# Patient Record
Sex: MALE | Race: WHITE | Employment: FULL TIME | URBAN - METROPOLITAN AREA
[De-identification: names, ages, dates, MRNs, and addresses within clinical notes are randomized per-mention and may not be internally consistent; named-entity substitution may affect disease eponyms.]

---

## 2018-12-23 ENCOUNTER — APPOINTMENT (OUTPATIENT)
Dept: GENERAL RADIOLOGY | Facility: CLINIC | Age: 43
End: 2018-12-23
Attending: EMERGENCY MEDICINE

## 2018-12-23 ENCOUNTER — HOSPITAL ENCOUNTER (EMERGENCY)
Facility: CLINIC | Age: 43
Discharge: HOME | End: 2018-12-23
Attending: EMERGENCY MEDICINE | Admitting: EMERGENCY MEDICINE

## 2018-12-23 VITALS
OXYGEN SATURATION: 92 % | RESPIRATION RATE: 18 BRPM | SYSTOLIC BLOOD PRESSURE: 144 MMHG | DIASTOLIC BLOOD PRESSURE: 92 MMHG | WEIGHT: 215 LBS | TEMPERATURE: 98 F | HEART RATE: 91 BPM

## 2018-12-23 DIAGNOSIS — J20.9 ACUTE BRONCHITIS, UNSPECIFIED ORGANISM: ICD-10-CM

## 2018-12-23 LAB
ALBUMIN SERPL-MCNC: 3.7 G/DL (ref 3.4–5)
ALP SERPL-CCNC: 93 U/L (ref 40–150)
ALT SERPL W P-5'-P-CCNC: 252 U/L (ref 0–70)
ANION GAP SERPL CALCULATED.3IONS-SCNC: 10 MMOL/L (ref 3–14)
AST SERPL W P-5'-P-CCNC: 201 U/L (ref 0–45)
BASOPHILS # BLD AUTO: 0 10E9/L (ref 0–0.2)
BASOPHILS NFR BLD AUTO: 0.3 %
BILIRUB SERPL-MCNC: 0.5 MG/DL (ref 0.2–1.3)
BUN SERPL-MCNC: 9 MG/DL (ref 7–30)
CALCIUM SERPL-MCNC: 8.3 MG/DL (ref 8.5–10.1)
CHLORIDE SERPL-SCNC: 103 MMOL/L (ref 94–109)
CO2 SERPL-SCNC: 24 MMOL/L (ref 20–32)
CREAT SERPL-MCNC: 0.58 MG/DL (ref 0.66–1.25)
DIFFERENTIAL METHOD BLD: ABNORMAL
EOSINOPHIL # BLD AUTO: 0 10E9/L (ref 0–0.7)
EOSINOPHIL NFR BLD AUTO: 0.1 %
ERYTHROCYTE [DISTWIDTH] IN BLOOD BY AUTOMATED COUNT: 13.9 % (ref 10–15)
ETHANOL SERPL-MCNC: 0.22 G/DL
GFR SERPL CREATININE-BSD FRML MDRD: >90 ML/MIN/{1.73_M2}
GLUCOSE SERPL-MCNC: 79 MG/DL (ref 70–99)
HCT VFR BLD AUTO: 48.2 % (ref 40–53)
HGB BLD-MCNC: 16.5 G/DL (ref 13.3–17.7)
IMM GRANULOCYTES # BLD: 0 10E9/L (ref 0–0.4)
IMM GRANULOCYTES NFR BLD: 0.3 %
LYMPHOCYTES # BLD AUTO: 1.5 10E9/L (ref 0.8–5.3)
LYMPHOCYTES NFR BLD AUTO: 12.4 %
MAGNESIUM SERPL-MCNC: 2.3 MG/DL (ref 1.6–2.3)
MCH RBC QN AUTO: 31.1 PG (ref 26.5–33)
MCHC RBC AUTO-ENTMCNC: 34.2 G/DL (ref 31.5–36.5)
MCV RBC AUTO: 91 FL (ref 78–100)
MONOCYTES # BLD AUTO: 0.7 10E9/L (ref 0–1.3)
MONOCYTES NFR BLD AUTO: 5.8 %
NEUTROPHILS # BLD AUTO: 9.6 10E9/L (ref 1.6–8.3)
NEUTROPHILS NFR BLD AUTO: 81.1 %
NRBC # BLD AUTO: 0 10*3/UL
NRBC BLD AUTO-RTO: 0 /100
PLATELET # BLD AUTO: 215 10E9/L (ref 150–450)
POTASSIUM SERPL-SCNC: 3.7 MMOL/L (ref 3.4–5.3)
PROT SERPL-MCNC: 7.6 G/DL (ref 6.8–8.8)
RBC # BLD AUTO: 5.3 10E12/L (ref 4.4–5.9)
SODIUM SERPL-SCNC: 137 MMOL/L (ref 133–144)
WBC # BLD AUTO: 11.8 10E9/L (ref 4–11)

## 2018-12-23 PROCEDURE — 96365 THER/PROPH/DIAG IV INF INIT: CPT

## 2018-12-23 PROCEDURE — 96366 THER/PROPH/DIAG IV INF ADDON: CPT

## 2018-12-23 PROCEDURE — 80320 DRUG SCREEN QUANTALCOHOLS: CPT | Performed by: EMERGENCY MEDICINE

## 2018-12-23 PROCEDURE — 25000128 H RX IP 250 OP 636: Performed by: EMERGENCY MEDICINE

## 2018-12-23 PROCEDURE — 96372 THER/PROPH/DIAG INJ SC/IM: CPT

## 2018-12-23 PROCEDURE — 83735 ASSAY OF MAGNESIUM: CPT | Performed by: EMERGENCY MEDICINE

## 2018-12-23 PROCEDURE — 71046 X-RAY EXAM CHEST 2 VIEWS: CPT

## 2018-12-23 PROCEDURE — 99285 EMERGENCY DEPT VISIT HI MDM: CPT | Mod: 25

## 2018-12-23 PROCEDURE — 25000125 ZZHC RX 250: Performed by: EMERGENCY MEDICINE

## 2018-12-23 PROCEDURE — 25000132 ZZH RX MED GY IP 250 OP 250 PS 637: Performed by: EMERGENCY MEDICINE

## 2018-12-23 PROCEDURE — 85025 COMPLETE CBC W/AUTO DIFF WBC: CPT | Performed by: EMERGENCY MEDICINE

## 2018-12-23 PROCEDURE — 80053 COMPREHEN METABOLIC PANEL: CPT | Performed by: EMERGENCY MEDICINE

## 2018-12-23 RX ORDER — NALTREXONE HYDROCHLORIDE 50 MG/1
50 TABLET, FILM COATED ORAL DAILY
Qty: 3 TABLET | Refills: 0 | Status: SHIPPED | OUTPATIENT
Start: 2018-12-23 | End: 2018-12-26

## 2018-12-23 RX ORDER — TRAZODONE HYDROCHLORIDE 100 MG/1
100 TABLET ORAL AT BEDTIME
Qty: 3 TABLET | Refills: 0 | Status: SHIPPED | OUTPATIENT
Start: 2018-12-23 | End: 2018-12-26

## 2018-12-23 RX ORDER — LORAZEPAM 1 MG/1
2 TABLET ORAL ONCE
Status: COMPLETED | OUTPATIENT
Start: 2018-12-23 | End: 2018-12-23

## 2018-12-23 RX ORDER — SERTRALINE HYDROCHLORIDE 100 MG/1
200 TABLET, FILM COATED ORAL DAILY
Qty: 6 TABLET | Refills: 0 | Status: SHIPPED | OUTPATIENT
Start: 2018-12-23 | End: 2019-06-22

## 2018-12-23 RX ORDER — GUAIFENESIN/DEXTROMETHORPHAN 100-10MG/5
10 SYRUP ORAL ONCE
Status: COMPLETED | OUTPATIENT
Start: 2018-12-23 | End: 2018-12-23

## 2018-12-23 RX ORDER — VARENICLINE TARTRATE 1 MG/1
1 TABLET, FILM COATED ORAL 2 TIMES DAILY
Qty: 6 TABLET | Refills: 0 | Status: SHIPPED | OUTPATIENT
Start: 2018-12-23 | End: 2018-12-26

## 2018-12-23 RX ORDER — BENZONATATE 100 MG/1
200 CAPSULE ORAL 3 TIMES DAILY PRN
Qty: 18 CAPSULE | Refills: 0 | Status: SHIPPED | OUTPATIENT
Start: 2018-12-23 | End: 2018-12-30

## 2018-12-23 RX ORDER — NALTREXONE HYDROCHLORIDE 50 MG/1
TABLET, FILM COATED ORAL DAILY
COMMUNITY
End: 2018-12-23

## 2018-12-23 RX ORDER — LORAZEPAM 2 MG/ML
2 INJECTION INTRAMUSCULAR ONCE
Status: COMPLETED | OUTPATIENT
Start: 2018-12-23 | End: 2018-12-23

## 2018-12-23 RX ADMIN — FOLIC ACID: 5 INJECTION, SOLUTION INTRAMUSCULAR; INTRAVENOUS; SUBCUTANEOUS at 11:55

## 2018-12-23 RX ADMIN — LORAZEPAM 2 MG: 1 TABLET ORAL at 14:25

## 2018-12-23 RX ADMIN — LORAZEPAM 2 MG: 2 INJECTION, SOLUTION INTRAMUSCULAR; INTRAVENOUS at 12:40

## 2018-12-23 RX ADMIN — GUAIFENESIN AND DEXTROMETHORPHAN 10 ML: 100; 10 SYRUP ORAL at 12:05

## 2018-12-23 ASSESSMENT — ENCOUNTER SYMPTOMS
COUGH: 1
CHILLS: 1
TREMORS: 1
DIAPHORESIS: 1
SEIZURES: 0

## 2018-12-23 NOTE — ED PROVIDER NOTES
History     Chief Complaint:  Cough, Alcohol withdrawal    HPI   Nick Albert is a 43 year old male with a history of alcohol abuse and depression who presents to the emergency department today for evaluation of a cough. The patient presents with a productive cough of bright green phlegm that he has been self-medicating by drinking alcohol. He has no blood in his phlegm and is afebrile. He has been drinking 750 mL vodka per day for the past few days. He last drank this morning at 0500 and poured out 250 mL before saying he needs help. He is having chills and sweats from the withdrawal. Tremors present.     He was in [place] 1 year ago and was sober for a month after leaving but has been drinking on and off since then, and has been drinking more this month in particular. He would like to be able to go back for treatment. He has no history of withdrawal seizures. He has a history of alcoholic blackouts. He has a history of a DUI 7 years ago. He further states that he think his Sertraline needs to be increased in dosage. No suicidal ideation. No history of hospitalization for depression.     Allergies:  No Known Drug Allergies    Medications:    naltrexone (DEPADE/REVIA) 50 MG tablet  SERTRALINE HCL PO  TRAZODONE HCL PO  Varenicline Tartrate (CHANTIX PO)    Past Medical History:    Alcohol abuse   Anxiety   Depressive disorder     Past Surgical History:    History reviewed. No pertinent surgical history.    Family History:    History reviewed. No pertinent family history.    Social History:  The patient was accompanied to the ED by his wife.  Smoking Status: Current Everyday Smoker   Smokeless Tobacco: Never Used  Alcohol Use: Positive   Marital Status:      Review of Systems   Constitutional: Positive for chills and diaphoresis.   Respiratory: Positive for cough.    Neurological: Positive for tremors. Negative for seizures.   Psychiatric/Behavioral: Negative for suicidal ideas.   All other systems  reviewed and are negative.    Physical Exam     Patient Vitals for the past 24 hrs:   BP Temp Temp src Pulse Resp SpO2 Weight   12/23/18 1345 -- -- -- -- -- 92 % --   12/23/18 1330 (!) 144/92 -- -- 91 -- 93 % --   12/23/18 1315 (!) 143/94 -- -- 104 -- 95 % --   12/23/18 1300 (!) 136/93 -- -- 89 -- -- --   12/23/18 1255 -- -- -- -- -- 95 % --   12/23/18 1250 -- -- -- -- -- 93 % --   12/23/18 1245 140/85 -- -- 86 -- 95 % --   12/23/18 1125 -- -- -- -- -- 95 % --   12/23/18 1120 139/90 -- -- 98 -- -- --   12/23/18 0903 (!) 140/91 98  F (36.7  C) Oral 100 18 95 % 97.5 kg (215 lb)      Physical Exam  General: Lying in the bed, slightly tremulous.  HEENT:   The scalp and head appear normal    The pupils are equal, round, and reactive to light    Extraocular muscles are intact.    The nose is normal.    The oropharynx is normal.      Uvula is in the midline.    Neck:  Normal range of motion.    Lungs:  Clear.      No rales, no wheezing.      There is no tachypnea.  Non-labored.      dry cough  Cardiac: Regular rate.      Normal S1 and S2.      No pathological murmur/rub    Abdomen: Soft. No distension, no localized tenderness or rebound.  MS:  Normal tone. Normal movement of all extremities.   Neurologic:     Normal mentation.  No cranial nerve deficits.  No focal motor or sensory changes.      Speech normal.  Psych:  Awake.     Alert.      Normal affect.      Appropriate interactions.    no hallucinations. no suicidal ideation  Skin:  No rash.      No lesions.       Emergency Department Course     Imaging:  Radiology findings were communicated with the patient who voiced understanding of the findings.    XR Chest 2 Views  Normal.  Reading per radiology     Laboratory:  Laboratory findings were communicated with the patient who voiced understanding of the findings.    ETOH: 0.22  CBC: WBC 11.8, HGB 16.5,   CMP: Creatinine 0.58, Calcium 8.3, ,   Magnesium 2.3    Interventions:  1155 Sodium chloride 0.9  % 1,000 mL with INFUVITE ADULT 10 mL, thiamine 100 mg, folic acid 1 mg  1205 Robitussin 10 mL PO  1240 Ativan 2 mg IM  1425 Ativan 2 mg PO    Emergency Department Course:    1110 Nursing notes and vitals reviewed.    1120 I performed an exam of the patient as documented above.     1311 IV was inserted and blood was drawn for laboratory testing, results above.     1139  The patient was sent for a XR while in the emergency department, results above.      1410 I personally reviewed the imaging and lab results with the patient and answered all related questions prior to discharge.    Impression & Plan      Medical Decision Making:  Nick Albert is a 43 year old male who presents to the emergency department today for evaluation of cough consistent with acute bronchitis.  He is mainly here though because he wants detoxification from alcohol.  Although he appears clinically sober he has an EtOH of 0.22 and is not early withdrawal.  He was given Ativan IM and Ativan p.o. and detox was set up.  Chest x-ray was negative for occult pneumonia.  He received IV fluids and banana bag here.  He is stable at this time for transfer to detox.  Paperwork filled out for transfer.    Diagnosis:    ICD-10-CM    1. Acute bronchitis, unspecified organism J20.9    2.      Alcohol withdrawal    Disposition:   Discharge    Discharge Medications:  START taking      Dose / Directions   benzonatate 100 MG capsule  Commonly known as:  TESSALON      Dose:  200 mg  Take 2 capsules (200 mg) by mouth 3 times daily as needed for cough  Quantity:  18 capsule  Refills:  0     Scribe Disclosure:  Ever PALOMARES, am serving as a scribe at 11:25 AM on 12/23/2018 to document services personally performed by Hollis Kim MD based on my observations and the provider's statements to me.    [facility] EMERGENCY DEPARTMENT       Hollis Kim MD  12/23/18 1705

## 2018-12-23 NOTE — ED TRIAGE NOTES
Pt has been having a cough for several  Days and it coughing so hard that he is incontinent.  Pt also is a heavy drinker and has been drinking more to help with his symptoms.  Pt does have an assessment at [facility] on Wednesday for alcohol treatment.  Concerned for withdrawal.

## 2019-06-22 ENCOUNTER — HOSPITAL ENCOUNTER (EMERGENCY)
Facility: CLINIC | Age: 44
Discharge: OTHER INSTITUTION | End: 2019-06-22
Attending: EMERGENCY MEDICINE | Admitting: EMERGENCY MEDICINE
Payer: COMMERCIAL

## 2019-06-22 VITALS
SYSTOLIC BLOOD PRESSURE: 148 MMHG | RESPIRATION RATE: 16 BRPM | HEART RATE: 98 BPM | DIASTOLIC BLOOD PRESSURE: 98 MMHG | WEIGHT: 204.37 LBS | TEMPERATURE: 98.3 F | OXYGEN SATURATION: 100 %

## 2019-06-22 DIAGNOSIS — F10.920 ALCOHOLIC INTOXICATION WITHOUT COMPLICATION (H): ICD-10-CM

## 2019-06-22 LAB — ALCOHOL BREATH TEST: 0.22 (ref 0–0.01)

## 2019-06-22 PROCEDURE — 82075 ASSAY OF BREATH ETHANOL: CPT

## 2019-06-22 PROCEDURE — 99283 EMERGENCY DEPT VISIT LOW MDM: CPT

## 2019-06-22 RX ORDER — SERTRALINE HYDROCHLORIDE 100 MG/1
200 TABLET, FILM COATED ORAL DAILY
Qty: 6 TABLET | Refills: 0 | Status: SHIPPED | OUTPATIENT
Start: 2019-06-22 | End: 2019-06-25

## 2019-06-22 NOTE — ED PROVIDER NOTES
"History     Chief Complaint:  Alcohol Intoxication    HPI  Nick Albert is a 43 year old male who presents with his dad to the emergency department today for evaluation of alcohol intoxication. The patient reports that he has been drinking alcohol for 25 years and is a recovering alcoholic that \"spends as much time as he can in meetings.\" He states that he does drink every day and when prompted about the amount he drinks daily, he reports, \"somedays none, somedays 20.\" He explains that he went on a chatman yesterday and wishes to stop drinking. He notes that he would like to go to detox. He states that his last drink was a couple hours ago.    Allergies:  No Known Drug Allergies    Medications:    Depade/revia  Zoloft  Sertraline HCL  Desyrel  Trazodone HCL  Chantix     Past Medical History:    Alcohol abuse  Anxiety  Depressive disorder    Past Surgical History:    Surgical history reviewed. No pertinent surgical history.    Family History:    Family history reviewed. No pertinent family history.    Social History:  The patient reports that he has been smoking.  He has never used smokeless tobacco. He reports that he drinks alcohol. He reports that he does not use drugs.   PCP: Surendra Brewer    Review of Systems   Gastrointestinal: Negative for abdominal pain, nausea and vomiting.   Neurological:        Intoxication   All other systems reviewed and are negative.      Physical Exam     Patient Vitals for the past 24 hrs:   BP Temp Temp src Pulse Heart Rate Resp SpO2 Weight   06/22/19 1252 (!) 148/98 -- -- 98 -- 16 100 % --   06/22/19 1017 (!) 144/101 98.3  F (36.8  C) Oral 110 110 16 97 % 92.7 kg (204 lb 5.9 oz)     Physical Exam  Nursing note and vitals reviewed.  Constitutional: Cooperative.   HENT:   Mouth/Throat: Moist mucous membranes.   Eyes: EOMI, nonicteric sclera  Cardiovascular: mild tachycardia, regular rhythm, no murmurs, rubs, or gallops  Pulmonary/Chest: Effort normal and breath sounds " normal. No respiratory distress. No wheezes. No rales.   Abdominal: Soft. Nontender, nondistended, no guarding or rigidity. BS present.   Musculoskeletal: Normal range of motion.   Neurological: Alert. Moves all extremities spontaneously.   Skin: Skin is warm and dry. No rash noted.   Psychiatric: Normal mood and affect.       Emergency Department Course     Laboratory:  Laboratory findings were communicated with the patient who voiced understanding of the findings.    Alcohol breath test POCT: 0.22 (A)      Impression & Plan      Medical Decision Making:  Nick Albert is a 43 year old male who presents to the emergency department today for evaluation of alcohol intoxication. Pt reports an alcohol problem and is requesting help with stopping. Detox bed available at [facility] and pt has a ride there. He was given 3 days of his medications. No other complaints today, and he's stable for discharge.     Diagnosis:    ICD-10-CM    1. Alcoholic intoxication without complication (H) F10.920      Disposition:   The patient is discharged to home.    Discharge Medications:  No discharge medications.    Scribe Disclosure:  I, Wero Thornton, am serving as a scribe at 11:48 AM on 6/22/2019 to document services personally performed by Jc Bonilla MD based on my observations and the provider's statements to me.    [facility] EMERGENCY DEPARTMENT         Jc Bonilla MD  06/24/19 0049

## 2019-06-22 NOTE — ED NOTES
Report to Mile KNIGHT at [place], ok to send him. She would like 3 day supply of any regular meds sent with him.

## 2019-06-22 NOTE — ED AVS SNAPSHOT
[facility] Emergency Department  [address]  [facility] [address]  Phone:  [phone]  Fax:  [phone]                                    Nick Albert   MRN: [id number]    Department:  [facility] Emergency Department   Date of Visit:  6/22/2019           After Visit Summary Signature Page    I have received my discharge instructions, and my questions have been answered. I have discussed any challenges I see with this plan with the nurse or doctor.    ..........................................................................................................................................  Patient/Patient Representative Signature      ..........................................................................................................................................  Patient Representative Print Name and Relationship to Patient    ..................................................               ................................................  Date                                   Time    ..........................................................................................................................................  Reviewed by Signature/Title    ...................................................              ..............................................  Date                                               Time          22EPIC Rev 08/18

## 2019-06-22 NOTE — ED TRIAGE NOTES
Patient is here with his dad, states his last drink was 8 hours ago but his dad says he was drinking vodka about 1.5 hours ago. Patient states he wants detox. He also took 3 ativan this morning. He is also feeling dehydrated. Denies suicidality.

## 2019-06-24 ASSESSMENT — ENCOUNTER SYMPTOMS
NAUSEA: 0
VOMITING: 0
ABDOMINAL PAIN: 0

## 2019-09-29 ENCOUNTER — HEALTH MAINTENANCE LETTER (OUTPATIENT)
Age: 44
End: 2019-09-29

## 2021-01-14 ENCOUNTER — HEALTH MAINTENANCE LETTER (OUTPATIENT)
Age: 46
End: 2021-01-14

## 2021-10-23 ENCOUNTER — HEALTH MAINTENANCE LETTER (OUTPATIENT)
Age: 46
End: 2021-10-23

## 2022-02-12 ENCOUNTER — HEALTH MAINTENANCE LETTER (OUTPATIENT)
Age: 47
End: 2022-02-12

## 2022-10-10 ENCOUNTER — HEALTH MAINTENANCE LETTER (OUTPATIENT)
Age: 47
End: 2022-10-10

## 2023-03-25 ENCOUNTER — HEALTH MAINTENANCE LETTER (OUTPATIENT)
Age: 48
End: 2023-03-25